# Patient Record
Sex: FEMALE | Race: WHITE
[De-identification: names, ages, dates, MRNs, and addresses within clinical notes are randomized per-mention and may not be internally consistent; named-entity substitution may affect disease eponyms.]

---

## 2021-04-03 ENCOUNTER — HOSPITAL ENCOUNTER (EMERGENCY)
Dept: HOSPITAL 54 - ER | Age: 51
LOS: 1 days | Discharge: HOME | End: 2021-04-04
Payer: COMMERCIAL

## 2021-04-03 VITALS — BODY MASS INDEX: 19.7 KG/M2 | HEIGHT: 68 IN | WEIGHT: 130 LBS
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
## 2021-04-03 DIAGNOSIS — F10.129: ICD-10-CM

## 2021-04-03 DIAGNOSIS — R45.851: Primary | ICD-10-CM

## 2021-04-03 DIAGNOSIS — Z20.822: ICD-10-CM

## 2021-04-03 DIAGNOSIS — Z88.0: ICD-10-CM

## 2021-04-03 LAB
BASOPHILS # BLD AUTO: 0.1 /CMM (ref 0–0.2)
BASOPHILS NFR BLD AUTO: 1 % (ref 0–2)
EOSINOPHIL NFR BLD AUTO: 2 % (ref 0–6)
HCT VFR BLD AUTO: 43 % (ref 33–45)
HGB BLD-MCNC: 14.1 G/DL (ref 11.5–14.8)
LYMPHOCYTES NFR BLD AUTO: 2.6 /CMM (ref 0.8–4.8)
LYMPHOCYTES NFR BLD AUTO: 43 % (ref 20–44)
MCHC RBC AUTO-ENTMCNC: 33 G/DL (ref 31–36)
MCV RBC AUTO: 92 FL (ref 82–100)
MONOCYTES NFR BLD AUTO: 0.3 /CMM (ref 0.1–1.3)
MONOCYTES NFR BLD AUTO: 5.6 % (ref 2–12)
NEUTROPHILS # BLD AUTO: 2.9 /CMM (ref 1.8–8.9)
NEUTROPHILS NFR BLD AUTO: 48.4 % (ref 43–81)
PLATELET # BLD AUTO: 296 /CMM (ref 150–450)
RBC # BLD AUTO: 4.67 MIL/UL (ref 4–5.2)
WBC NRBC COR # BLD AUTO: 6 K/UL (ref 4.3–11)

## 2021-04-03 PROCEDURE — 81003 URINALYSIS AUTO W/O SCOPE: CPT

## 2021-04-03 PROCEDURE — C9803 HOPD COVID-19 SPEC COLLECT: HCPCS

## 2021-04-03 PROCEDURE — G0480 DRUG TEST DEF 1-7 CLASSES: HCPCS

## 2021-04-03 PROCEDURE — 36415 COLL VENOUS BLD VENIPUNCTURE: CPT

## 2021-04-03 PROCEDURE — 87426 SARSCOV CORONAVIRUS AG IA: CPT

## 2021-04-03 PROCEDURE — 80048 BASIC METABOLIC PNL TOTAL CA: CPT

## 2021-04-03 PROCEDURE — 80076 HEPATIC FUNCTION PANEL: CPT

## 2021-04-03 PROCEDURE — 80307 DRUG TEST PRSMV CHEM ANLYZR: CPT

## 2021-04-03 PROCEDURE — 99291 CRITICAL CARE FIRST HOUR: CPT

## 2021-04-03 PROCEDURE — 80299 QUANTITATIVE ASSAY DRUG: CPT

## 2021-04-03 PROCEDURE — 96372 THER/PROPH/DIAG INJ SC/IM: CPT

## 2021-04-03 PROCEDURE — 85025 COMPLETE CBC W/AUTO DIFF WBC: CPT

## 2021-04-03 PROCEDURE — 80320 DRUG SCREEN QUANTALCOHOLS: CPT

## 2021-04-03 RX ADMIN — OLANZAPINE ONE MG: 10 INJECTION, POWDER, FOR SOLUTION INTRAMUSCULAR at 23:49

## 2021-04-03 NOTE — NUR
PT [employer] FROM HOME. PER [employer] OFFICER, PT MAKING THREATENING COMMENTS TO 
FAMILY MEMBERS AND PUSHED MOTHER EARLIER TODAY. BEING PLACED ON 5150 BY [employer] 
FOR DTO. PT AAOX4, APPEARS INTOXICATED. VITAL SIGNS STABLE. RESPIRATIONS EVEN 
AND UNLABORED. NO ACUTE DISTRESS NOTED AT THIS TIME. SAFETY PRECAUTIONS 
INITIATED. [employer] AT BEDSIDE, WILL CONTINUE TO MONITOR

## 2021-04-04 VITALS — SYSTOLIC BLOOD PRESSURE: 132 MMHG | DIASTOLIC BLOOD PRESSURE: 74 MMHG

## 2021-04-04 LAB
ALBUMIN SERPL BCP-MCNC: 4.1 G/DL (ref 3.4–5)
ALP SERPL-CCNC: 94 U/L (ref 46–116)
ALT SERPL W P-5'-P-CCNC: 36 U/L (ref 12–78)
APAP SERPL-MCNC: 0 UG/ML (ref 10–30)
AST SERPL W P-5'-P-CCNC: 44 U/L (ref 15–37)
BILIRUB DIRECT SERPL-MCNC: 0.2 MG/DL (ref 0–0.2)
BILIRUB SERPL-MCNC: 0.5 MG/DL (ref 0.2–1)
BILIRUB UR QL STRIP: NEGATIVE
BUN SERPL-MCNC: 14 MG/DL (ref 7–18)
CALCIUM SERPL-MCNC: 8.9 MG/DL (ref 8.5–10.1)
CHLORIDE SERPL-SCNC: 102 MMOL/L (ref 98–107)
CO2 SERPL-SCNC: 29 MMOL/L (ref 21–32)
COLOR UR: YELLOW
CREAT SERPL-MCNC: 0.8 MG/DL (ref 0.6–1.3)
ETHANOL SERPL-MCNC: 334 MG/DL (ref 0–0)
GLUCOSE SERPL-MCNC: 96 MG/DL (ref 74–106)
GLUCOSE UR STRIP-MCNC: NEGATIVE MG/DL
LEUKOCYTE ESTERASE UR QL STRIP: NEGATIVE
NITRITE UR QL STRIP: NEGATIVE
PH UR STRIP: 6 [PH] (ref 5–8)
POTASSIUM SERPL-SCNC: 4 MMOL/L (ref 3.5–5.1)
PROT SERPL-MCNC: 8.4 G/DL (ref 6.4–8.2)
PROT UR QL STRIP: NEGATIVE MG/DL
SODIUM SERPL-SCNC: 143 MMOL/L (ref 136–145)
UROBILINOGEN UR STRIP-MCNC: 0.2 EU/DL

## 2021-04-04 NOTE — NUR
CRISIS EVALUATOR LELA RN AT BEDSIDE FOR EVAL AND SPOKE TO THE PATIENT AND 
MOTHER. WITNESSED BY THIS WRITER. PATIENT'S MOM STS SHE'S OK WITH THE PATIENT 
TO GO BACK HOME AND DOESN'T BELIEVE PATIENT BELONGS TO A PSYCH FACILITY.

## 2021-04-04 NOTE — NUR
ASSESSED PT ON BED ASLEEP EASILY AROUSABLE, NOT IN RESPIRATORY DISTRESS, V/S 
STABLE, KEPT RESTED AND COMFORTABLE. WILL CONTINUE TO MONITOR.

## 2021-04-04 NOTE — NUR
PATIENT TALKED TO HER MOM ON THE PHONE. AA/OX4, AMBULATORY WITH STEADY GAIT. NO 
S/SX OF ALCOHOL WITHDRAWAL SYMPTOMS AT THIS TIME. VSS.

## 2021-04-04 NOTE — NUR
PT RESTING COMFORTABLY IN BED, EASILY AROUSABLE. VITAL SIGNS STABLE. NO ACUTE 
DISTRESS NOTED AT THIS TIME. WILL CONTINUE TO MONITOR

## 2021-04-04 NOTE — NUR
CALLED CRISIS EVALUATOR, REPEAT ETHANOL . PATIENT IS AA/OX4, AMBULATORY 
WITH STEADY GAIT. NO DISTRESS NOTED. DENIES SI/HI AT THIS TIME.

## 2021-04-04 NOTE — NUR
PT AAOX4, VSS. RR EVEN & UNLABORED. DENIES ANY DISCOMFORT. Patient discharged 
to home in stable condition. Written and verbal after care instructions given. 
Patient verbalizes understanding of instruction. PT AMB WITH STEADY GAIT UPON 
LEAVING ED.

## 2021-04-30 ENCOUNTER — HOSPITAL ENCOUNTER (EMERGENCY)
Dept: HOSPITAL 54 - ER | Age: 51
Discharge: HOME | End: 2021-04-30
Payer: COMMERCIAL

## 2021-04-30 VITALS — SYSTOLIC BLOOD PRESSURE: 127 MMHG | DIASTOLIC BLOOD PRESSURE: 83 MMHG

## 2021-04-30 VITALS — WEIGHT: 130 LBS | BODY MASS INDEX: 19.7 KG/M2 | HEIGHT: 68 IN

## 2021-04-30 DIAGNOSIS — R42: ICD-10-CM

## 2021-04-30 DIAGNOSIS — Z88.0: ICD-10-CM

## 2021-04-30 DIAGNOSIS — F19.10: Primary | ICD-10-CM

## 2021-04-30 DIAGNOSIS — I10: ICD-10-CM

## 2021-04-30 LAB
ALBUMIN SERPL BCP-MCNC: 4 G/DL (ref 3.4–5)
ALP SERPL-CCNC: 90 U/L (ref 46–116)
ALT SERPL W P-5'-P-CCNC: 42 U/L (ref 12–78)
APAP SERPL-MCNC: 8 UG/ML (ref 10–30)
AST SERPL W P-5'-P-CCNC: 78 U/L (ref 15–37)
BASOPHILS # BLD AUTO: 0.1 /CMM (ref 0–0.2)
BASOPHILS NFR BLD AUTO: 0.7 % (ref 0–2)
BILIRUB DIRECT SERPL-MCNC: 0.2 MG/DL (ref 0–0.2)
BILIRUB SERPL-MCNC: 0.6 MG/DL (ref 0.2–1)
BUN SERPL-MCNC: 18 MG/DL (ref 7–18)
CALCIUM SERPL-MCNC: 8.6 MG/DL (ref 8.5–10.1)
CHLORIDE SERPL-SCNC: 94 MMOL/L (ref 98–107)
CO2 SERPL-SCNC: 29 MMOL/L (ref 21–32)
COLOR UR: YELLOW
CREAT SERPL-MCNC: 1.1 MG/DL (ref 0.6–1.3)
EOSINOPHIL NFR BLD AUTO: 1.2 % (ref 0–6)
ETHANOL SERPL-MCNC: 140 MG/DL (ref 0–0)
GLUCOSE SERPL-MCNC: 102 MG/DL (ref 74–106)
HCT VFR BLD AUTO: 42 % (ref 33–45)
HGB BLD-MCNC: 13.5 G/DL (ref 11.5–14.8)
LYMPHOCYTES NFR BLD AUTO: 2.2 /CMM (ref 0.8–4.8)
LYMPHOCYTES NFR BLD AUTO: 26.4 % (ref 20–44)
MCHC RBC AUTO-ENTMCNC: 33 G/DL (ref 31–36)
MCV RBC AUTO: 93 FL (ref 82–100)
MONOCYTES NFR BLD AUTO: 0.7 /CMM (ref 0.1–1.3)
MONOCYTES NFR BLD AUTO: 8.5 % (ref 2–12)
NEUTROPHILS # BLD AUTO: 5.2 /CMM (ref 1.8–8.9)
NEUTROPHILS NFR BLD AUTO: 63.2 % (ref 43–81)
PH UR STRIP: 5.5 [PH] (ref 5–8)
PLATELET # BLD AUTO: 240 /CMM (ref 150–450)
POTASSIUM SERPL-SCNC: 4.4 MMOL/L (ref 3.5–5.1)
PROT SERPL-MCNC: 7.7 G/DL (ref 6.4–8.2)
RBC # BLD AUTO: 4.44 MIL/UL (ref 4–5.2)
SODIUM SERPL-SCNC: 136 MMOL/L (ref 136–145)
UROBILINOGEN UR STRIP-MCNC: 0.2 EU/DL
WBC NRBC COR # BLD AUTO: 8.3 K/UL (ref 4.3–11)

## 2021-04-30 PROCEDURE — G0480 DRUG TEST DEF 1-7 CLASSES: HCPCS

## 2021-04-30 NOTE — NUR
PATIENT BIBRA FROM HOME C/O FEELING WEAK SINCE TODAY. PATIENT STATES THAT SHE 
TOOK A SLEEPING PILL AND HAD SOME TO DRINK. PATIENT IS AAOX4. NO SOB. BREATHING 
EVENLY AND UNLABORED ON ROOM AIR. CONNECTED TO THE CARDIAC MONITOR.

## 2021-05-06 ENCOUNTER — HOSPITAL ENCOUNTER (EMERGENCY)
Dept: HOSPITAL 54 - ER | Age: 51
Discharge: HOME | End: 2021-05-06
Payer: COMMERCIAL

## 2021-05-06 VITALS — HEIGHT: 68 IN | WEIGHT: 130 LBS | BODY MASS INDEX: 19.7 KG/M2

## 2021-05-06 VITALS — DIASTOLIC BLOOD PRESSURE: 94 MMHG | SYSTOLIC BLOOD PRESSURE: 150 MMHG

## 2021-05-06 DIAGNOSIS — I10: ICD-10-CM

## 2021-05-06 DIAGNOSIS — R42: Primary | ICD-10-CM

## 2021-05-06 DIAGNOSIS — Z88.0: ICD-10-CM

## 2021-05-06 LAB
BASOPHILS # BLD AUTO: 0 /CMM (ref 0–0.2)
BASOPHILS NFR BLD AUTO: 0.2 % (ref 0–2)
BUN SERPL-MCNC: 10 MG/DL (ref 7–18)
CALCIUM SERPL-MCNC: 8.7 MG/DL (ref 8.5–10.1)
CHLORIDE SERPL-SCNC: 92 MMOL/L (ref 98–107)
CO2 SERPL-SCNC: 24 MMOL/L (ref 21–32)
CREAT SERPL-MCNC: 0.7 MG/DL (ref 0.6–1.3)
EOSINOPHIL NFR BLD AUTO: 0.7 % (ref 0–6)
GLUCOSE SERPL-MCNC: 151 MG/DL (ref 74–106)
HCT VFR BLD AUTO: 41 % (ref 33–45)
HGB BLD-MCNC: 13.3 G/DL (ref 11.5–14.8)
LYMPHOCYTES NFR BLD AUTO: 0.7 /CMM (ref 0.8–4.8)
LYMPHOCYTES NFR BLD AUTO: 8.3 % (ref 20–44)
MCHC RBC AUTO-ENTMCNC: 33 G/DL (ref 31–36)
MCV RBC AUTO: 94 FL (ref 82–100)
MONOCYTES NFR BLD AUTO: 0.7 /CMM (ref 0.1–1.3)
MONOCYTES NFR BLD AUTO: 9.2 % (ref 2–12)
NEUTROPHILS # BLD AUTO: 6.5 /CMM (ref 1.8–8.9)
NEUTROPHILS NFR BLD AUTO: 81.6 % (ref 43–81)
PLATELET # BLD AUTO: 243 /CMM (ref 150–450)
POTASSIUM SERPL-SCNC: 3.3 MMOL/L (ref 3.5–5.1)
RBC # BLD AUTO: 4.32 MIL/UL (ref 4–5.2)
SODIUM SERPL-SCNC: 129 MMOL/L (ref 136–145)
WBC NRBC COR # BLD AUTO: 8 K/UL (ref 4.3–11)

## 2021-05-06 PROCEDURE — 80048 BASIC METABOLIC PNL TOTAL CA: CPT

## 2021-05-06 PROCEDURE — 99284 EMERGENCY DEPT VISIT MOD MDM: CPT

## 2021-05-06 PROCEDURE — 84484 ASSAY OF TROPONIN QUANT: CPT

## 2021-05-06 PROCEDURE — 85025 COMPLETE CBC W/AUTO DIFF WBC: CPT

## 2021-05-06 PROCEDURE — 96360 HYDRATION IV INFUSION INIT: CPT

## 2021-05-06 PROCEDURE — 84703 CHORIONIC GONADOTROPIN ASSAY: CPT

## 2021-05-06 PROCEDURE — 36415 COLL VENOUS BLD VENIPUNCTURE: CPT

## 2021-05-06 PROCEDURE — 93005 ELECTROCARDIOGRAM TRACING: CPT

## 2021-05-06 NOTE — NUR
DIZZINESS X 2 DAYS W/ NOTED HIGH BLOOD PRESSURE. PATIENT A/OX4, BREATHING EVEN 
AND UNLABORED, NO SOB NOTED, NEEDS ATTENDED. KEPT COMFORTABLE.

## 2021-05-06 NOTE — NUR
Patient completed IV fluids. Patient a/ox4, breathing even and unlabored, no 
sob noted, needs attended. Kept comfortable. IV removed. Catheter intact and 
site benign. Pressure and 4x4 applied to site. No bleeding noted.Patient 
discharged to home in stable condition. Written and verbal after care 
instructions given. Patient verbalizes understanding of instruction.

## 2021-06-29 ENCOUNTER — HOSPITAL ENCOUNTER (EMERGENCY)
Dept: HOSPITAL 54 - ER | Age: 51
Discharge: HOME | End: 2021-06-29
Payer: COMMERCIAL

## 2021-06-29 VITALS — HEIGHT: 68 IN | WEIGHT: 126 LBS | BODY MASS INDEX: 19.1 KG/M2

## 2021-06-29 VITALS — SYSTOLIC BLOOD PRESSURE: 142 MMHG | DIASTOLIC BLOOD PRESSURE: 81 MMHG

## 2021-06-29 DIAGNOSIS — R42: Primary | ICD-10-CM

## 2021-06-29 DIAGNOSIS — F13.10: ICD-10-CM

## 2021-06-29 DIAGNOSIS — F32.9: ICD-10-CM

## 2021-06-29 DIAGNOSIS — Z60.2: ICD-10-CM

## 2021-06-29 DIAGNOSIS — Z88.0: ICD-10-CM

## 2021-06-29 DIAGNOSIS — I10: ICD-10-CM

## 2021-06-29 DIAGNOSIS — E87.6: ICD-10-CM

## 2021-06-29 LAB
BASOPHILS # BLD AUTO: 0 K/UL (ref 0–0.2)
BASOPHILS NFR BLD AUTO: 0.4 % (ref 0–2)
BUN SERPL-MCNC: 9 MG/DL (ref 7–18)
CALCIUM SERPL-MCNC: 9.2 MG/DL (ref 8.5–10.1)
CHLORIDE SERPL-SCNC: 92 MMOL/L (ref 98–107)
CO2 SERPL-SCNC: 28 MMOL/L (ref 21–32)
CREAT SERPL-MCNC: 0.6 MG/DL (ref 0.6–1.3)
EOSINOPHIL NFR BLD AUTO: 0.4 % (ref 0–6)
ETHANOL SERPL-MCNC: < 3 MG/DL (ref 0–0)
GLUCOSE SERPL-MCNC: 123 MG/DL (ref 74–106)
HCT VFR BLD AUTO: 40 % (ref 33–45)
HGB BLD-MCNC: 13.1 G/DL (ref 11.5–14.8)
LYMPHOCYTES NFR BLD AUTO: 1.1 K/UL (ref 0.8–4.8)
LYMPHOCYTES NFR BLD AUTO: 11.1 % (ref 20–44)
MCHC RBC AUTO-ENTMCNC: 33 G/DL (ref 31–36)
MCV RBC AUTO: 97 FL (ref 82–100)
MONOCYTES NFR BLD AUTO: 0.6 K/UL (ref 0.1–1.3)
MONOCYTES NFR BLD AUTO: 5.8 % (ref 2–12)
NEUTROPHILS # BLD AUTO: 8.5 K/UL (ref 1.8–8.9)
NEUTROPHILS NFR BLD AUTO: 82.3 % (ref 43–81)
PLATELET # BLD AUTO: 217 K/UL (ref 150–450)
POTASSIUM SERPL-SCNC: 3.1 MMOL/L (ref 3.5–5.1)
RBC # BLD AUTO: 4.14 MIL/UL (ref 4–5.2)
SODIUM SERPL-SCNC: 131 MMOL/L (ref 136–145)
TSH SERPL DL<=0.005 MIU/L-ACNC: 1.43 UIU/ML (ref 0.36–3.74)
WBC NRBC COR # BLD AUTO: 10.3 K/UL (ref 4.3–11)

## 2021-06-29 PROCEDURE — 80048 BASIC METABOLIC PNL TOTAL CA: CPT

## 2021-06-29 PROCEDURE — 85025 COMPLETE CBC W/AUTO DIFF WBC: CPT

## 2021-06-29 PROCEDURE — 93005 ELECTROCARDIOGRAM TRACING: CPT

## 2021-06-29 PROCEDURE — G0480 DRUG TEST DEF 1-7 CLASSES: HCPCS

## 2021-06-29 PROCEDURE — 36415 COLL VENOUS BLD VENIPUNCTURE: CPT

## 2021-06-29 PROCEDURE — 84443 ASSAY THYROID STIM HORMONE: CPT

## 2021-06-29 PROCEDURE — 99285 EMERGENCY DEPT VISIT HI MDM: CPT

## 2021-06-29 PROCEDURE — 96374 THER/PROPH/DIAG INJ IV PUSH: CPT

## 2021-06-29 PROCEDURE — 80307 DRUG TEST PRSMV CHEM ANLYZR: CPT

## 2021-06-29 PROCEDURE — 80320 DRUG SCREEN QUANTALCOHOLS: CPT

## 2021-06-29 PROCEDURE — 96361 HYDRATE IV INFUSION ADD-ON: CPT

## 2021-06-29 PROCEDURE — 71045 X-RAY EXAM CHEST 1 VIEW: CPT

## 2021-06-29 SDOH — SOCIAL STABILITY - SOCIAL INSECURITY: PROBLEMS RELATED TO LIVING ALONE: Z60.2

## 2021-06-29 NOTE — NUR
PATIENT CAME IN TO THE ER C/O DIZZINESS X 3 DAYS,BP ELEVATED,RAN OUT OF XANAX 
AND EFFEXOR. ON ROOM AIR, BREATHING EVENLY AND UNLABORED. CONNECTED TO THE 
MONITOR AND PULSE OX. KEPT COMFORTABLE, WILL CONTINUE TO MONITOR ACCORDINGLY.

## 2021-09-26 ENCOUNTER — HOSPITAL ENCOUNTER (EMERGENCY)
Dept: HOSPITAL 54 - ER | Age: 51
Discharge: HOME | End: 2021-09-26
Payer: COMMERCIAL

## 2021-09-26 VITALS — BODY MASS INDEX: 19.7 KG/M2 | HEIGHT: 68 IN | WEIGHT: 130 LBS

## 2021-09-26 VITALS — SYSTOLIC BLOOD PRESSURE: 146 MMHG | DIASTOLIC BLOOD PRESSURE: 78 MMHG

## 2021-09-26 DIAGNOSIS — Z79.899: ICD-10-CM

## 2021-09-26 DIAGNOSIS — Z88.0: ICD-10-CM

## 2021-09-26 DIAGNOSIS — F32.9: ICD-10-CM

## 2021-09-26 DIAGNOSIS — F41.9: Primary | ICD-10-CM

## 2021-09-26 DIAGNOSIS — I10: ICD-10-CM

## 2021-09-26 NOTE — NUR
PT REFUSING TO SIGN DISCHARGE PAPERS. PT UPSET WITH MD. PRESCRIPTION AND 
EDUCATION PAPERS PROVIDED. MD MADE AWARE.

## 2021-09-26 NOTE — NUR
PT BROUGT IN FOR ANXIETY ATTACK. PT IS A/OX3. VSS. PT ON ROOM AIR SHOWING NO 
S/S OF RESP DISTRES/SOB. WILL CONTINUE TO MONITOR AND ASSESS FOR ANY CHANGES.

## 2022-04-23 NOTE — NUR
TO ER BED 10. [id number] FRM HOME C/O WEAKNESS x 5DAYS. PT STATED THAT "I JUST 
WANT A REFILL FOR EFFEXOR AND BE ABLE TO GO HOME. DR POLLARD AT BEDSIDE.